# Patient Record
(demographics unavailable — no encounter records)

---

## 2024-10-16 NOTE — HISTORY OF PRESENT ILLNESS
[Patient reported colonoscopy was normal] : Patient reported colonoscopy was normal [HIV test declined] : HIV test declined [Syphilis test declined] : Syphilis test declined [Gonorrhea test declined] : Gonorrhea test declined [Chlamydia test declined] : Chlamydia test declined [Trichomonas test declined] : Trichomonas test declined [HPV test declined] : HPV test declined [Hepatitis B test declined] : Hepatitis B test declined [Hepatitis C test declined] : Hepatitis C test declined [N] : Patient does not use contraception [Y] : Positive pregnancy history [Currently Active] : currently active [Men] : men [Vaginal] : vaginal [No] : No [TextBox_4] : PT HERE FOR ANNUAL EXAM LMP:2017 [Mammogramdate] : 11/8/23 [TextBox_19] : BR2 [BreastSonogramDate] : 2/1/22 [TextBox_25] : BR2 [PapSmeardate] : 10/10/23 [TextBox_31] : WNL [BoneDensityDate] : 8/11/23 [TextBox_37] : OSTEOPENIA [ColonoscopyDate] : 2015 [LMPDate] : 2017 [PGxTotal] : 1 [[facility] Center[facility]] : 1 [[facility] Hospital[facility]] : 1 [TextBox_6] : 2017 [FreeTextEntry1] : 2017

## 2024-10-16 NOTE — PHYSICAL EXAM
[Chaperone Present] : A chaperone was present in the examining room during all aspects of the physical examination [[id number]] : A chaperone was present during the pelvic exam. [Appropriately responsive] : appropriately responsive [Alert] : alert [No Acute Distress] : no acute distress [No Lymphadenopathy] : no lymphadenopathy [Regular Rate Rhythm] : regular rate rhythm [No Murmurs] : no murmurs [Clear to Auscultation B/L] : clear to auscultation bilaterally [Mass] : mass [Soft] : soft [Non-tender] : non-tender [Non-distended] : non-distended [No HSM] : No HSM [No Lesions] : no lesions [No Mass] : no mass [Oriented x3] : oriented x3 [Examination Of The Breasts] : a normal appearance [Breast Palpation Diffuse Fibrous Tissue Bilateral] : fibrocystic changes [___cm] : a ~M [unfilled] ~Ucm superior lateral quadrant mass was palpated [Labia Majora] : normal [Labia Minora] : normal [Normal] : normal [Uterine Adnexae] : normal [Declined] : Patient declined rectal exam [FreeTextEntry2] : KF [FreeTextEntry6] : Breast symmetrical, 1 cm mass at 10:00 2 cm from areola border nonmobile and nontender. [FreeTextEntry8] : No masses nontender bilateral

## 2024-10-16 NOTE — PLAN
[FreeTextEntry1] : Pap smear completed patient will have diagnostic right breast mammogram plus bilateral breast sonogram, breast self-examination taught she will return in 1 month for repeat breast examination.  Patient had colonoscopy approximately 9 years ago recommended repeat in 10 years she will be notified next year plus history of osteopenia 2023 will repeat bone density in 2025 continue vitamin D3 multivitamins.  Patient to return in 1 month for repeat breast examination pending results of mammogram and sonogram.